# Patient Record
Sex: FEMALE | Race: OTHER | NOT HISPANIC OR LATINO | URBAN - METROPOLITAN AREA
[De-identification: names, ages, dates, MRNs, and addresses within clinical notes are randomized per-mention and may not be internally consistent; named-entity substitution may affect disease eponyms.]

---

## 2022-04-10 ENCOUNTER — EMERGENCY (EMERGENCY)
Facility: HOSPITAL | Age: 32
LOS: 1 days | Discharge: HOME | End: 2022-04-10
Attending: EMERGENCY MEDICINE | Admitting: EMERGENCY MEDICINE
Payer: COMMERCIAL

## 2022-04-10 VITALS
TEMPERATURE: 98 F | HEART RATE: 82 BPM | RESPIRATION RATE: 18 BRPM | OXYGEN SATURATION: 98 % | SYSTOLIC BLOOD PRESSURE: 117 MMHG | DIASTOLIC BLOOD PRESSURE: 71 MMHG

## 2022-04-10 VITALS
WEIGHT: 255.07 LBS | DIASTOLIC BLOOD PRESSURE: 82 MMHG | HEART RATE: 81 BPM | OXYGEN SATURATION: 99 % | RESPIRATION RATE: 18 BRPM | SYSTOLIC BLOOD PRESSURE: 121 MMHG | TEMPERATURE: 98 F

## 2022-04-10 DIAGNOSIS — R10.9 UNSPECIFIED ABDOMINAL PAIN: ICD-10-CM

## 2022-04-10 DIAGNOSIS — Z88.0 ALLERGY STATUS TO PENICILLIN: ICD-10-CM

## 2022-04-10 DIAGNOSIS — N83.11 CORPUS LUTEUM CYST OF RIGHT OVARY: ICD-10-CM

## 2022-04-10 DIAGNOSIS — D25.9 LEIOMYOMA OF UTERUS, UNSPECIFIED: ICD-10-CM

## 2022-04-10 LAB
ALBUMIN SERPL ELPH-MCNC: 4 G/DL — SIGNIFICANT CHANGE UP (ref 3.3–5)
ALP SERPL-CCNC: 97 U/L — SIGNIFICANT CHANGE UP (ref 40–120)
ALT FLD-CCNC: 8 U/L — LOW (ref 10–45)
ANION GAP SERPL CALC-SCNC: 12 MMOL/L — SIGNIFICANT CHANGE UP (ref 5–17)
APPEARANCE UR: CLEAR — SIGNIFICANT CHANGE UP
AST SERPL-CCNC: 26 U/L — SIGNIFICANT CHANGE UP (ref 10–40)
BACTERIA # UR AUTO: PRESENT /HPF
BASOPHILS # BLD AUTO: 0.05 K/UL — SIGNIFICANT CHANGE UP (ref 0–0.2)
BASOPHILS NFR BLD AUTO: 0.7 % — SIGNIFICANT CHANGE UP (ref 0–2)
BILIRUB SERPL-MCNC: 0.2 MG/DL — SIGNIFICANT CHANGE UP (ref 0.2–1.2)
BILIRUB UR-MCNC: NEGATIVE — SIGNIFICANT CHANGE UP
BUN SERPL-MCNC: 9 MG/DL — SIGNIFICANT CHANGE UP (ref 7–23)
CALCIUM SERPL-MCNC: 9 MG/DL — SIGNIFICANT CHANGE UP (ref 8.4–10.5)
CHLORIDE SERPL-SCNC: 105 MMOL/L — SIGNIFICANT CHANGE UP (ref 96–108)
CO2 SERPL-SCNC: 21 MMOL/L — LOW (ref 22–31)
COLOR SPEC: YELLOW — SIGNIFICANT CHANGE UP
COMMENT - URINE: SIGNIFICANT CHANGE UP
CREAT SERPL-MCNC: 0.57 MG/DL — SIGNIFICANT CHANGE UP (ref 0.5–1.3)
DIFF PNL FLD: NEGATIVE — SIGNIFICANT CHANGE UP
EGFR: 125 ML/MIN/1.73M2 — SIGNIFICANT CHANGE UP
EOSINOPHIL # BLD AUTO: 0.12 K/UL — SIGNIFICANT CHANGE UP (ref 0–0.5)
EOSINOPHIL NFR BLD AUTO: 1.7 % — SIGNIFICANT CHANGE UP (ref 0–6)
EPI CELLS # UR: SIGNIFICANT CHANGE UP /HPF (ref 0–5)
GLUCOSE SERPL-MCNC: 108 MG/DL — HIGH (ref 70–99)
GLUCOSE UR QL: NEGATIVE — SIGNIFICANT CHANGE UP
HCT VFR BLD CALC: 40.6 % — SIGNIFICANT CHANGE UP (ref 34.5–45)
HGB BLD-MCNC: 13.9 G/DL — SIGNIFICANT CHANGE UP (ref 11.5–15.5)
HYALINE CASTS # UR AUTO: SIGNIFICANT CHANGE UP /LPF (ref 0–2)
IMM GRANULOCYTES NFR BLD AUTO: 0.3 % — SIGNIFICANT CHANGE UP (ref 0–1.5)
KETONES UR-MCNC: NEGATIVE — SIGNIFICANT CHANGE UP
LEUKOCYTE ESTERASE UR-ACNC: ABNORMAL
LYMPHOCYTES # BLD AUTO: 2.07 K/UL — SIGNIFICANT CHANGE UP (ref 1–3.3)
LYMPHOCYTES # BLD AUTO: 29 % — SIGNIFICANT CHANGE UP (ref 13–44)
MCHC RBC-ENTMCNC: 31.4 PG — SIGNIFICANT CHANGE UP (ref 27–34)
MCHC RBC-ENTMCNC: 34.2 GM/DL — SIGNIFICANT CHANGE UP (ref 32–36)
MCV RBC AUTO: 91.9 FL — SIGNIFICANT CHANGE UP (ref 80–100)
MONOCYTES # BLD AUTO: 0.41 K/UL — SIGNIFICANT CHANGE UP (ref 0–0.9)
MONOCYTES NFR BLD AUTO: 5.7 % — SIGNIFICANT CHANGE UP (ref 2–14)
NEUTROPHILS # BLD AUTO: 4.48 K/UL — SIGNIFICANT CHANGE UP (ref 1.8–7.4)
NEUTROPHILS NFR BLD AUTO: 62.6 % — SIGNIFICANT CHANGE UP (ref 43–77)
NITRITE UR-MCNC: NEGATIVE — SIGNIFICANT CHANGE UP
NRBC # BLD: 0 /100 WBCS — SIGNIFICANT CHANGE UP (ref 0–0)
PH UR: 6 — SIGNIFICANT CHANGE UP (ref 5–8)
PLATELET # BLD AUTO: 207 K/UL — SIGNIFICANT CHANGE UP (ref 150–400)
POTASSIUM SERPL-MCNC: SIGNIFICANT CHANGE UP MMOL/L (ref 3.5–5.3)
POTASSIUM SERPL-SCNC: SIGNIFICANT CHANGE UP MMOL/L (ref 3.5–5.3)
PROT SERPL-MCNC: 7.5 G/DL — SIGNIFICANT CHANGE UP (ref 6–8.3)
PROT UR-MCNC: NEGATIVE MG/DL — SIGNIFICANT CHANGE UP
RBC # BLD: 4.42 M/UL — SIGNIFICANT CHANGE UP (ref 3.8–5.2)
RBC # FLD: 12.9 % — SIGNIFICANT CHANGE UP (ref 10.3–14.5)
RBC CASTS # UR COMP ASSIST: < 5 /HPF — SIGNIFICANT CHANGE UP
SODIUM SERPL-SCNC: 138 MMOL/L — SIGNIFICANT CHANGE UP (ref 135–145)
SP GR SPEC: 1.02 — SIGNIFICANT CHANGE UP (ref 1–1.03)
UROBILINOGEN FLD QL: 0.2 E.U./DL — SIGNIFICANT CHANGE UP
WBC # BLD: 7.15 K/UL — SIGNIFICANT CHANGE UP (ref 3.8–10.5)
WBC # FLD AUTO: 7.15 K/UL — SIGNIFICANT CHANGE UP (ref 3.8–10.5)
WBC UR QL: < 5 /HPF — SIGNIFICANT CHANGE UP

## 2022-04-10 PROCEDURE — 80053 COMPREHEN METABOLIC PANEL: CPT

## 2022-04-10 PROCEDURE — 76856 US EXAM PELVIC COMPLETE: CPT

## 2022-04-10 PROCEDURE — 76856 US EXAM PELVIC COMPLETE: CPT | Mod: 26

## 2022-04-10 PROCEDURE — 81001 URINALYSIS AUTO W/SCOPE: CPT

## 2022-04-10 PROCEDURE — 99284 EMERGENCY DEPT VISIT MOD MDM: CPT | Mod: 25

## 2022-04-10 PROCEDURE — 99285 EMERGENCY DEPT VISIT HI MDM: CPT

## 2022-04-10 PROCEDURE — 85025 COMPLETE CBC W/AUTO DIFF WBC: CPT

## 2022-04-10 PROCEDURE — 76830 TRANSVAGINAL US NON-OB: CPT

## 2022-04-10 PROCEDURE — 76830 TRANSVAGINAL US NON-OB: CPT | Mod: 26

## 2022-04-10 PROCEDURE — 36415 COLL VENOUS BLD VENIPUNCTURE: CPT

## 2022-04-10 RX ORDER — IBUPROFEN 200 MG
600 TABLET ORAL ONCE
Refills: 0 | Status: COMPLETED | OUTPATIENT
Start: 2022-04-10 | End: 2022-04-10

## 2022-04-10 RX ADMIN — Medication 600 MILLIGRAM(S): at 13:38

## 2022-04-10 NOTE — ED PROVIDER NOTE - OBJECTIVE STATEMENT
The pt is a 31 y/o F, who presents to ED c/o R sided abd "pressure" x 1 wk. Pt states "I don't have pain, it's pressure", constant and dull, 5/10, non radiating, took a tyl w/o change in symptoms, hx of ruptured ovarian cyst in past, LMP 3/20. Denies n/v/d, anorexia, flank pain, dysuria, vag dc, fevers, chills, dizziness. Sexually active but no concern for STDs, declining testing, has own gyn for f/u and just had routine visit (all neg)

## 2022-04-10 NOTE — ED PROVIDER NOTE - NS ED ATTENDING STATEMENT MOD
This was a shared visit with the LETI. I reviewed and verified the documentation and independently performed the documented:

## 2022-04-10 NOTE — ED PROVIDER NOTE - NSFOLLOWUPINSTRUCTIONS_ED_ALL_ED_FT
take ibuprofen as needed, follow up with your gyn doctor  Ovarian Cyst  An ovarian cyst is a fluid-filled sac on an ovary. Most of these cysts go away on their own and are not cancer. Some cysts need treatment  What are the causes?  •Ovarian hyperstimulation syndrome. Some medicines may lead to this problem.  •Polycystic ovarian syndrome (PCOS). Problems with body chemicals (hormones) can lead to this condition.  •The normal menstrual cycle.  What increases the risk  •Being overweight or very overweight.  •Taking medicines to increase your chance of getting pregnant.  •Using some types of birth control.  •Smoking.  What are the signs or symptoms?  Many ovarian cysts do not cause symptoms. If you get symptoms, you may have:  •Pain or pressure in the area between the hip bones.  •Pain in the lower belly.  •Pain during sex.  •Swelling in the lower belly.  •Periods that are not regular.  •Pain with periods.  How is this treated?  Many ovarian cysts go away on their own without treatment. If you need treatment, it may include:  •Medicines for pain.  •Fluid taken out of the cyst.  •The cyst being taken out.  •Birth control pills or other medicines.  •Surgery to remove the ovary.  Follow these instructions at home:  •Take over-the-counter and prescription medicines only as told by your doctor.  •Ask your doctor if you should avoid driving or using machines while you are taking your medicine.  •Get exams and Pap tests as told by your doctor.  •Return to your normal activities when your doctor says that it is safe.  • Do not smoke or use any products that contain nicotine or tobacco. If you need help quitting, ask your doctor.  •Keep all follow-up visits.  Contact a doctor if:  •Your periods:  •Are late.  •Are not regular.  •Stop.  •Are painful.  •You have pain in the area between your hip bones, and the pain does not go away.  •You feel pressure on your bladder.  •You have trouble peeing.  •You feel full, or your belly hurts, swells, or bloats.  •You gain or lose weight without trying, or you are less hungry than normal.  •You feel pain and pressure in your back.  •You feel pain and pressure in the area between your hip bones.  •You think you may be pregnant.  Get help right away if:  •You have pain in your belly that is very bad or gets worse.  •You have pain in the area between your hip bones, and the pain is very bad or gets worse.  •You cannot eat or drink without vomiting.  •You get a fever or chills all of a sudden.  •Your period is a lot heavier than usual.  Summary  •An ovarian cyst is a fluid-filled sac on an ovary.  •Some cysts may cause problems and need treatment.      •Most of these cysts go away on their own.

## 2022-04-10 NOTE — ED PROVIDER NOTE - ATTENDING CONTRIBUTION TO CARE
Vitals wnl, exam as above. Very well appearing.   UA trace leuk esterase, no infection. Labs grossly wnl. ucg neg. US prelim showing "Right corpus luteal cyst, likely ruptured with mild fluid about the right   adnexa and within the cul-de-sac." Given motrin.  Pt pain free, still mild pressure. Benign abdomen. Clinically not appy.      Discussed importance of outpt follow up and return precautions. Clinically no indication for further emergent ED workup or hospitalization at this time. Comfortable for dc, outpt f/u.

## 2022-04-10 NOTE — ED PROVIDER NOTE - PATIENT PORTAL LINK FT
You can access the FollowMyHealth Patient Portal offered by [facility] by registering at the following website: [email or web address]. By joining [employer]’s FollowMyHealth portal, you will also be able to view your health information using other applications (apps) compatible with our system.

## 2022-04-10 NOTE — ED ADULT NURSE NOTE - NSIMPLEMENTINTERV_GEN_ALL_ED
Implemented All Universal Safety Interventions:  [place] to call system. Call bell, personal items and telephone within reach. Instruct patient to call for assistance. Room bathroom lighting operational. Non-slip footwear when patient is off stretcher. Physically safe environment: no spills, clutter or unnecessary equipment. Stretcher in lowest position, wheels locked, appropriate side rails in place.

## 2022-04-10 NOTE — ED ADULT NURSE NOTE - OBJECTIVE STATEMENT
pt a&ox4 ambulatory comes to ED c/o non-radiating, intermittent abdominal pressure RLQ x 1 week, pt. reports day after having sex. Pt. reports pressure began in her pelvic region after sex, and moved over to her RLQ. Pt. denies pain / tenderness upon palpation, abdominal bloating, n/v/d, fever, chills, heartburn. Pt. denies burning while urinating, recalls stronger odor to her urine over last few days. Pt. reports she urinated after intercourse, LMP 03/20.

## 2022-04-10 NOTE — ED PROVIDER NOTE - CLINICAL SUMMARY MEDICAL DECISION MAKING FREE TEXT BOX
pt c/o r lower abd "pressure" x 1wk, hx of ruptured ovarian cyst in past, lmp 3/20, no associated n/v/d/anorexia/fevers - no concern for appy, no dysuria - ua neg, no vag dc - pt offered std testing but declined - just saw her gyn for routine exam and neg, labs wnl, us done and + ruptured cyst, pt symptomatically improved w/ibuprofen - to con't same, f/u w/gyn, pt understands and agrees w/plan, strict return precautions given

## 2023-05-02 ENCOUNTER — EMERGENCY (EMERGENCY)
Facility: HOSPITAL | Age: 33
LOS: 1 days | Discharge: HOME | End: 2023-05-02
Attending: STUDENT IN AN ORGANIZED HEALTH CARE EDUCATION/TRAINING PROGRAM | Admitting: STUDENT IN AN ORGANIZED HEALTH CARE EDUCATION/TRAINING PROGRAM
Payer: COMMERCIAL

## 2023-05-02 VITALS
SYSTOLIC BLOOD PRESSURE: 107 MMHG | OXYGEN SATURATION: 98 % | WEIGHT: 220.02 LBS | DIASTOLIC BLOOD PRESSURE: 79 MMHG | TEMPERATURE: 99 F | HEART RATE: 117 BPM | RESPIRATION RATE: 18 BRPM | HEIGHT: 65 IN

## 2023-05-02 VITALS
RESPIRATION RATE: 18 BRPM | SYSTOLIC BLOOD PRESSURE: 120 MMHG | OXYGEN SATURATION: 99 % | TEMPERATURE: 98 F | DIASTOLIC BLOOD PRESSURE: 86 MMHG | HEART RATE: 88 BPM

## 2023-05-02 PROCEDURE — 99284 EMERGENCY DEPT VISIT MOD MDM: CPT

## 2023-05-02 RX ORDER — SODIUM CHLORIDE 9 MG/ML
1000 INJECTION, SOLUTION INTRAVENOUS ONCE
Refills: 0 | Status: COMPLETED | OUTPATIENT
Start: 2023-05-02 | End: 2023-05-02

## 2023-05-02 NOTE — ED ADULT NURSE NOTE - OBJECTIVE STATEMENT
pt states she developed diarrhea yesterday (no n/v) and went to  who told her she has a stomach bug and should drink gatrorade.   pt went home and had been drinking red gatorade and then today her stool is red and she is concerned that she is internally bleeding.

## 2023-05-02 NOTE — ED PROVIDER NOTE - CLINICAL SUMMARY MEDICAL DECISION MAKING FREE TEXT BOX
32 yo female with no medical hx p/w blood diarrhea that started today- associated with abdominal cramping and nausea that started 3 days ago after eating a chicken head during a [employer] ceremony. Now asymptomatic. No blood on rectal exam. No abdominal ttp. Showed a picture of her bloody stools in the toilet bowel from earlier today. Will obtain labs to check lytes and Hb. GI PCR. Likely gastroenteritis- viral vs bacterial

## 2023-05-02 NOTE — ED PROVIDER NOTE - PHYSICAL EXAMINATION
VITAL SIGNS: I have reviewed nursing notes and confirm.  CONSTITUTIONAL: Well appearing, in no acute distress.   SKIN:  warm and dry, no acute rash.   HEAD:  normocephalic, atraumatic.  EYES: EOM intact; conjunctiva and sclera clear.  ENT: No nasal discharge; airway clear.   NECK: Supple; non tender.  CARD: S1, S2 normal; no murmurs, gallops, or rubs. Regular rate and rhythm.   RESP:  Clear to auscultation b/l, no wheezes, rales or rhonchi.  ABD: Normal bowel sounds; soft; non-distended; non-tender; no guarding/ rebound.  RECTAL: soft brown stool, no BRBPR or melena  EXT: Normal ROM. No clubbing, cyanosis or edema. 2+ pulses to b/l ue/le.  NEURO: Alert, oriented, grossly unremarkable  PSYCH: Cooperative, mood and affect appropriate.

## 2023-05-02 NOTE — ED ADULT NURSE NOTE - CAS TRG GEN SKIN CONDITION
Warm/Dry Consent (Spinal Accessory)/Introductory Paragraph: The rationale for Mohs was explained to the patient and consent was obtained. The risks, benefits and alternatives to therapy were discussed in detail. Specifically, the risks of damage to the spinal accessory nerve, infection, scarring, bleeding, prolonged wound healing, incomplete removal, allergy to anesthesia, and recurrence were addressed. Prior to the procedure, the treatment site was clearly identified and confirmed by the patient. All components of Universal Protocol/PAUSE Rule completed.

## 2023-05-02 NOTE — ED ADULT NURSE NOTE - CHIEF COMPLAINT QUOTE
diarrhea and fever yesterday, abdominal pain went to urgent care today, advised to drink Gatorade, about 2 hours ago had diarrhea with red colored stool pt concerned might be blood or due to red colored Gatorade she drink about 2pm today.

## 2023-05-02 NOTE — ED ADULT TRIAGE NOTE - CHIEF COMPLAINT QUOTE
diarrhea and fever yesterday, abdominal pain went to urgent care, advised to drink Gatorade, about 2 hours ago had diarrhea with red colored stool pt concerned might be blood or due to red colored Gatorade she drink about 2pm today. diarrhea and fever yesterday, abdominal pain went to urgent care today, advised to drink Gatorade, about 2 hours ago had diarrhea with red colored stool pt concerned might be blood or due to red colored Gatorade she drink about 2pm today.

## 2023-05-02 NOTE — ED PROVIDER NOTE - OBJECTIVE STATEMENT
32 yo female with no medical hx p/w blood diarrhea that started today- associated with abdominal cramping and nausea that started 3 days ago after eating a chicken head during a [employer] ceremony. Reports she ate a cooked chicken head 3 days ago. Nobody else ate from the same dish. Reports feeling abdominal cramping and nausea immediately after. Associated diarrhea that became bloody today. Abdominal cramping and nausea resolved. Denies urinary symptoms, rectal pain, vaginal bleeding, hematuria, dysuria, vomiting. Reports fever 100.6 2 days ago. No dizziness, syncope, cp, sob. No recent travel or antibx use. No hx of inflammatory bowel disease.

## 2023-05-02 NOTE — ED ADULT NURSE NOTE - NSIMPLEMENTINTERV_GEN_ALL_ED
Implemented All Universal Safety Interventions:  [place] to call system. Call bell, personal items and telephone within reach. Instruct patient to call for assistance. Room bathroom lighting operational. Non-slip footwear when patient is off stretcher. Physically safe environment: no spills, clutter or unnecessary equipment. Stretcher in lowest position, wheels locked, appropriate side rails in place.

## 2023-05-02 NOTE — ED ADULT NURSE NOTE - NS ED NURSE DC INFO COMPLEXITY
Moderate: Comprehensive teaching/Straightforward: Basic instructions, no meds, no home treatment/Returned Demonstration

## 2023-05-02 NOTE — ED PROVIDER NOTE - NSFOLLOWUPINSTRUCTIONS_ED_ALL_ED_FT
English    Bloody Diarrhea  Bloody diarrhea is frequent loose and watery bowel movements that contain blood. The blood can be hard to see or notice (occult). Bloody diarrhea may be caused by medical conditions such as:  Ulcerative colitis.  Crohn's disease.  Intestinal infection.  Viral gastroenteritis or bacterial gastroenteritis.  Finding out why there is blood in your diarrhea is necessary so that your health care provider can prescribe the right treatment for you. Follow the instructions from your health care provider about treating the cause of your bloody diarrhea.    Any type of diarrhea can make you feel weak and dehydrated. Dehydration can make you tired and thirsty, cause you to have a dry mouth, and decrease how often you urinate.    Follow these instructions at home:  Eating and drinking    A bottle of clear fruit juice and glass of water.   Bread, rice, and cereal from the grain group.   Follow these recommendations as told by your health care provider:  Take an oral rehydration solution (ORS). This is an over-the-counter medicine that helps return your body to its normal balance of nutrients and water. It is found at pharmacies and retail stores.  Drink enough fluid to keep your urine pale yellow.  Drink fluids such as water, ice chips, diluted fruit juice, and low-calorie sports drinks. You can also drink milk products, if desired.  Avoid drinking fluids that contain a lot of sugar or caffeine, such as energy drinks, regular sports drinks, and soda.  Avoid alcohol.  Eat bland, easy-to-digest foods in small amounts as you are able. These foods include bananas, applesauce, rice, lean meats, toast, and crackers.  Avoid spicy or fatty foods.  Medicines    Take over-the-counter and prescription medicines only as told by your health care provider.  Your health care provider may prescribe medicine to slow down the frequency of diarrhea or to ease stomach discomfort.  If you were prescribed an antibiotic medicine, take it as told by your health care provider. Do not stop using the antibiotic even if you start to feel better.  General instructions    Washing hands with soap and water.  Wash your hands often using soap and water. If soap and water are not available, use a hand . Others in the household should wash their hands as well. Hands should be washed:  After using the toilet or changing a diaper.  Before preparing, cooking, or serving food.  While caring for a sick person or while visiting someone in a hospital.  Rest at home while you recover.  Take a warm bath to relieve any burning or pain from frequent diarrhea episodes.  Watch your condition for any changes.  Keep all follow-up visits as told by your health care provider. This is important.  Contact a health care provider if:  You have a fever.  Your diarrhea gets worse.  You have new symptoms.  You cannot keep fluids down.  You feel light-headed or dizzy.  You have a headache.  You have muscle cramps.  Get help right away if:  You have chest pain.  You feel extremely weak or you faint.  The blood in your diarrhea increases or turns a different color.  You vomit and the vomit is bloody or looks black.  You have persistent diarrhea.  You have severe pain, cramping, or bloating in your abdomen.  You have trouble breathing or you are breathing very quickly.  Your heart is beating very quickly.  Your skin feels cold and clammy.  You feel confused.  You have signs of dehydration, such as:  Dark urine, very little urine, or no urine.  Cracked lips.  Dry mouth.  Sunken eyes.  Sleepiness.  Weakness.  Summary  Bloody diarrhea is frequent loose and watery bowel movements that contain blood. The blood can be hard to see or notice (occult).  Follow the instructions from your health care provider about treating the cause of your bloody diarrhea.  Any type of diarrhea can make you feel weak and dehydrated.  Follow your health care provider's recommendations for eating and drinking and for taking medicines.  Contact your health care provider if your symptoms get worse. Get help right away if you have signs of dehydration.  This information is not intended to replace advice given to you by your health care provider. Make sure you discuss any questions you have with your health care provider.    Document Revised: 06/29/2022 Document Reviewed: 06/29/2022  [facility] Patient Education © 2023 [employer]

## 2023-05-02 NOTE — ED PROVIDER NOTE - PROGRESS NOTE DETAILS
HypoK on labs. Given repletion. Diarrhea resolved; unable to give us sample for GI PCR. Discharged home with follow up and return precautions.

## 2023-05-03 PROCEDURE — 36415 COLL VENOUS BLD VENIPUNCTURE: CPT

## 2023-05-03 PROCEDURE — 99283 EMERGENCY DEPT VISIT LOW MDM: CPT | Mod: 25

## 2023-05-03 PROCEDURE — 80053 COMPREHEN METABOLIC PANEL: CPT

## 2023-05-03 PROCEDURE — 85025 COMPLETE CBC W/AUTO DIFF WBC: CPT

## 2023-05-03 PROCEDURE — 84702 CHORIONIC GONADOTROPIN TEST: CPT

## 2023-05-03 PROCEDURE — 96360 HYDRATION IV INFUSION INIT: CPT

## 2023-05-03 RX ORDER — POTASSIUM CHLORIDE 20 MEQ
40 PACKET (EA) ORAL ONCE
Refills: 0 | Status: COMPLETED | OUTPATIENT
Start: 2023-05-03 | End: 2023-05-03

## 2023-05-03 RX ADMIN — Medication 40 MILLIEQUIVALENT(S): at 00:39

## 2023-05-03 RX ADMIN — SODIUM CHLORIDE 1000 MILLILITER(S): 9 INJECTION, SOLUTION INTRAVENOUS at 00:19

## 2023-05-03 RX ADMIN — SODIUM CHLORIDE 1000 MILLILITER(S): 9 INJECTION, SOLUTION INTRAVENOUS at 01:14

## 2023-05-03 RX ADMIN — Medication 10 MILLIGRAM(S): at 00:18

## 2023-05-05 ENCOUNTER — EMERGENCY (EMERGENCY)
Facility: HOSPITAL | Age: 33
LOS: 1 days | Discharge: HOME | End: 2023-05-05
Attending: STUDENT IN AN ORGANIZED HEALTH CARE EDUCATION/TRAINING PROGRAM | Admitting: STUDENT IN AN ORGANIZED HEALTH CARE EDUCATION/TRAINING PROGRAM
Payer: COMMERCIAL

## 2023-05-05 VITALS
SYSTOLIC BLOOD PRESSURE: 120 MMHG | WEIGHT: 218.04 LBS | HEIGHT: 65 IN | TEMPERATURE: 98 F | DIASTOLIC BLOOD PRESSURE: 84 MMHG | OXYGEN SATURATION: 99 % | HEART RATE: 86 BPM | RESPIRATION RATE: 17 BRPM

## 2023-05-05 DIAGNOSIS — K92.1 MELENA: ICD-10-CM

## 2023-05-05 DIAGNOSIS — R19.7 DIARRHEA, UNSPECIFIED: ICD-10-CM

## 2023-05-05 DIAGNOSIS — Z88.0 ALLERGY STATUS TO PENICILLIN: ICD-10-CM

## 2023-05-05 DIAGNOSIS — R10.9 UNSPECIFIED ABDOMINAL PAIN: ICD-10-CM

## 2023-05-05 DIAGNOSIS — R11.2 NAUSEA WITH VOMITING, UNSPECIFIED: ICD-10-CM

## 2023-05-05 DIAGNOSIS — R11.0 NAUSEA: ICD-10-CM

## 2023-05-05 PROCEDURE — 99284 EMERGENCY DEPT VISIT MOD MDM: CPT

## 2023-05-05 RX ORDER — SODIUM CHLORIDE 9 MG/ML
1000 INJECTION INTRAMUSCULAR; INTRAVENOUS; SUBCUTANEOUS ONCE
Refills: 0 | Status: COMPLETED | OUTPATIENT
Start: 2023-05-05 | End: 2023-05-05

## 2023-05-05 RX ADMIN — SODIUM CHLORIDE 1000 MILLILITER(S): 9 INJECTION INTRAMUSCULAR; INTRAVENOUS; SUBCUTANEOUS at 23:53

## 2023-05-05 NOTE — ED ADULT NURSE NOTE - OBJECTIVE STATEMENT
pt c/o rectal bleeding since 2 days ago, has been seen in the ED for same complaint. pt states that she ate bad chicken 2 days ago, and has been feeling sick since. pt states that she has salmonella and needs antibiotics. lungs clear bilaterally upon aucultation. no use of accessory muscles noted. Denies any chest pain or discomfort at this time. denies nausea vomiting or diarrhea at this time.

## 2023-05-05 NOTE — ED PROVIDER NOTE - PROGRESS NOTE DETAILS
pt unable provide stool sample, labs- no leukocytosis, no changes in H/H. pt is pain free. pt seeking discharge home.

## 2023-05-05 NOTE — ED PROVIDER NOTE - CLINICAL SUMMARY MEDICAL DECISION MAKING FREE TEXT BOX
32 y/o F with no PMHx presents to the ED c/o bloody diarrhea. Pt was seen in ED on May 2nd for same symptoms and was discharged home. Abdominal exam is benign, will perform repeat blood test and obtain GI PCR to check stool for possible bacteria, viruses and cultures. Anticipate discharge home. Based on clinical presentation no imaging needed for at this time. 32 y/o F with no PMHx presents to the ED c/o bloody diarrhea. Pt was seen in ED on May 2nd for same symptoms and was discharged home. Abdominal exam is benign, will repeat blood test and obtain GI PCR to check stool for possible bacteria, viruses.  Anticipate discharge home. Based on clinical presentation unlikely acute abdominal process and  no imaging needed at this time.

## 2023-05-05 NOTE — ED PROVIDER NOTE - ATTENDING APP SHARED VISIT CONTRIBUTION OF CARE
32 y/o F with no PMHx presents to the ED c/o bloody diarrhea. Pt was seen in ED on May 2nd for same symptoms and was discharged home. Abdominal exam is benign, will repeat blood test and obtain GI PCR to check stool for possible bacteria, viruses.  Anticipate discharge home. Based on clinical presentation unlikely acute abdominal process and  no imaging needed at this time.

## 2023-05-05 NOTE — ED PROVIDER NOTE - NSFOLLOWUPINSTRUCTIONS_ED_ALL_ED_FT
Please follow up with gastroenterologist ion a few days for re-evaluation if your symptoms persists.       Rectal Bleeding    Rectal bleeding is when blood passes out of the opening between the buttocks (anus). People with rectal bleeding may notice bright red blood in their underwear or in the toilet after having a bowel movement. They may also have blood mixed with their stool (feces), or dark red or black stools.    Rectal bleeding is usually a sign that something is wrong. Many things can cause rectal bleeding, including:  Diverticulosis. This is a condition in which pockets or sacs project from the bowel.  Hemorrhoids. These are blood vessels around the anus or inside the rectum that are larger than normal.  Anal fissures. This is a tear in the anus.  Proctitis and colitis. These are conditions in which the rectum, colon, or anus become inflamed.  Polyps. These are growths that can be cancerous (malignant) or noncancerous (benign).  Infections of the intestines.  Fistulas. These are abnormal openings in the rectum and anus.  Rectal prolapse. This is when a part of the rectum sticks out from the anus.  Follow these instructions at home:  Pay attention to any changes in your symptoms. Take these actions to help reduce bleeding and discomfort:    Medicines    Take over-the-counter and prescription medicines only as told by your health care provider.  Ask your health care provider about changing or stopping your regular medicines or supplements. This is especially important if you are taking blood thinners. Medicines that thin the blood can make rectal bleeding worse.  Managing constipation      Your condition may cause constipation. To prevent or treat constipation, or to help make your stools soft, you may need to:  Drink enough fluid to keep your urine pale yellow.  Take over-the-counter or prescription medicines.  Eat foods that are high in fiber, such as beans, whole grains, and fresh fruits and vegetables. Ask your health care provider if you need a supplement to give you more fiber.  Limit foods that are high in fat and processed sugars, such as fried or sweet foods.  General instructions    Try not to strain when having a bowel movement.  Try taking a warm bath. This may help to soothe any pain in your rectum.  Keep all follow-up visits as told by your health care provider. This is important.  Contact a health care provider if you:  Have pain or tenderness in your abdomen.  Have a fever.  Have weakness.  Have nausea.  Cannot have a bowel movement.  Get help right away if you have:  New or increased rectal bleeding.  Black or dark red stools.  Vomit with blood or something that looks like coffee grounds.  A fainting episode.  Severe pain in your rectum.  Summary  Rectal bleeding is usually a sign that something is wrong. This condition should be evaluated by a health care provider.  Eat a diet that is high in fiber. This will help keep your stools soft, making it easier to pass stools without straining.  Medicines that thin the blood can make rectal bleeding worse.  Get help right away if you have new or increased rectal bleeding, black or dark red stools, blood in your vomit, an episode of fainting, or severe pain in your rectum.  This information is not intended to replace advice given to you by your health care provider. Make sure you discuss any questions you have with your health care provider.

## 2023-05-05 NOTE — ED ADULT TRIAGE NOTE - CHIEF COMPLAINT QUOTE
Pt presents with c/o ongoing "rectal bleeding". Pt states "after I have a bowel movement, I see blood on the toilet paper (BRB) and some in my stool". No bleeding afterwards per pt. Denies any abdominal pain. Seen in ER x 2 days ago for c/o same.

## 2023-05-05 NOTE — ED PROVIDER NOTE - PATIENT PORTAL LINK FT
You can access the FollowMyHealth Patient Portal offered by [facility] by registering at the following website: [email or web address]. By joining [employer]’s FollowMyHealth portal, you will also be able to view your health information using other applications (apps) compatible with our system.

## 2023-05-05 NOTE — ED PROVIDER NOTE - OBJECTIVE STATEMENT
34 y/o F healthy with no PMHx presents to the ED c/o bloody diarrhea. Pt was seen for same symptoms on May 2nd, reports that when her diarrhea had started. Pt mentioned she had some nausea and vomiting the day before the diarrhea started. Pt also reports some cramping abdominal pain and 1 days of fever. After evaluation in ER on May 2nd blood work was normal, pt was hydrated and feeling better and was discharged home. Pt states she was felling well yesterday but today had another episode of diarrhea and a little bloody mucous clot with it. Pt states her pain is completely gone and reports no nausea, vomiting, fever today. Pt states she was improving and feeling better until her episode of diarrhea tonight.

## 2023-05-06 VITALS
HEART RATE: 74 BPM | SYSTOLIC BLOOD PRESSURE: 122 MMHG | DIASTOLIC BLOOD PRESSURE: 79 MMHG | RESPIRATION RATE: 16 BRPM | OXYGEN SATURATION: 98 %

## 2023-05-06 LAB
ALBUMIN SERPL ELPH-MCNC: 3.7 G/DL — SIGNIFICANT CHANGE UP (ref 3.3–5)
ALP SERPL-CCNC: 95 U/L — SIGNIFICANT CHANGE UP (ref 40–120)
ALT FLD-CCNC: 13 U/L — SIGNIFICANT CHANGE UP (ref 10–45)
ANION GAP SERPL CALC-SCNC: 11 MMOL/L — SIGNIFICANT CHANGE UP (ref 5–17)
AST SERPL-CCNC: 24 U/L — SIGNIFICANT CHANGE UP (ref 10–40)
BASOPHILS # BLD AUTO: 0.07 K/UL — SIGNIFICANT CHANGE UP (ref 0–0.2)
BASOPHILS NFR BLD AUTO: 0.9 % — SIGNIFICANT CHANGE UP (ref 0–2)
BILIRUB SERPL-MCNC: <0.2 MG/DL — SIGNIFICANT CHANGE UP (ref 0.2–1.2)
BUN SERPL-MCNC: 9 MG/DL — SIGNIFICANT CHANGE UP (ref 7–23)
CALCIUM SERPL-MCNC: 9.3 MG/DL — SIGNIFICANT CHANGE UP (ref 8.4–10.5)
CHLORIDE SERPL-SCNC: 104 MMOL/L — SIGNIFICANT CHANGE UP (ref 96–108)
CO2 SERPL-SCNC: 22 MMOL/L — SIGNIFICANT CHANGE UP (ref 22–31)
CREAT SERPL-MCNC: 0.78 MG/DL — SIGNIFICANT CHANGE UP (ref 0.5–1.3)
EGFR: 104 ML/MIN/1.73M2 — SIGNIFICANT CHANGE UP
EOSINOPHIL # BLD AUTO: 0 K/UL — SIGNIFICANT CHANGE UP (ref 0–0.5)
EOSINOPHIL NFR BLD AUTO: 0 % — SIGNIFICANT CHANGE UP (ref 0–6)
GIANT PLATELETS BLD QL SMEAR: PRESENT — SIGNIFICANT CHANGE UP
GLUCOSE SERPL-MCNC: 91 MG/DL — SIGNIFICANT CHANGE UP (ref 70–99)
HCT VFR BLD CALC: 40.7 % — SIGNIFICANT CHANGE UP (ref 34.5–45)
HGB BLD-MCNC: 13.6 G/DL — SIGNIFICANT CHANGE UP (ref 11.5–15.5)
LYMPHOCYTES # BLD AUTO: 3.22 K/UL — SIGNIFICANT CHANGE UP (ref 1–3.3)
LYMPHOCYTES # BLD AUTO: 39.8 % — SIGNIFICANT CHANGE UP (ref 13–44)
MANUAL SMEAR VERIFICATION: SIGNIFICANT CHANGE UP
MCHC RBC-ENTMCNC: 30.8 PG — SIGNIFICANT CHANGE UP (ref 27–34)
MCHC RBC-ENTMCNC: 33.4 GM/DL — SIGNIFICANT CHANGE UP (ref 32–36)
MCV RBC AUTO: 92.1 FL — SIGNIFICANT CHANGE UP (ref 80–100)
MONOCYTES # BLD AUTO: 0.5 K/UL — SIGNIFICANT CHANGE UP (ref 0–0.9)
MONOCYTES NFR BLD AUTO: 6.2 % — SIGNIFICANT CHANGE UP (ref 2–14)
NEUTROPHILS # BLD AUTO: 4.16 K/UL — SIGNIFICANT CHANGE UP (ref 1.8–7.4)
NEUTROPHILS NFR BLD AUTO: 51.3 % — SIGNIFICANT CHANGE UP (ref 43–77)
NRBC # BLD: 1 /100 — HIGH (ref 0–0)
NRBC # BLD: SIGNIFICANT CHANGE UP /100 WBCS (ref 0–0)
PLAT MORPH BLD: NORMAL — SIGNIFICANT CHANGE UP
PLATELET # BLD AUTO: 406 K/UL — HIGH (ref 150–400)
POTASSIUM SERPL-MCNC: SIGNIFICANT CHANGE UP MMOL/L (ref 3.5–5.3)
POTASSIUM SERPL-SCNC: SIGNIFICANT CHANGE UP MMOL/L (ref 3.5–5.3)
PROT SERPL-MCNC: 7.4 G/DL — SIGNIFICANT CHANGE UP (ref 6–8.3)
RBC # BLD: 4.42 M/UL — SIGNIFICANT CHANGE UP (ref 3.8–5.2)
RBC # FLD: 12.7 % — SIGNIFICANT CHANGE UP (ref 10.3–14.5)
RBC BLD AUTO: NORMAL — SIGNIFICANT CHANGE UP
SODIUM SERPL-SCNC: 137 MMOL/L — SIGNIFICANT CHANGE UP (ref 135–145)
VARIANT LYMPHS # BLD: 1.8 % — SIGNIFICANT CHANGE UP (ref 0–6)
WBC # BLD: 8.1 K/UL — SIGNIFICANT CHANGE UP (ref 3.8–10.5)
WBC # FLD AUTO: 8.1 K/UL — SIGNIFICANT CHANGE UP (ref 3.8–10.5)

## 2023-05-06 PROCEDURE — 36415 COLL VENOUS BLD VENIPUNCTURE: CPT

## 2023-05-06 PROCEDURE — 80053 COMPREHEN METABOLIC PANEL: CPT

## 2023-05-06 PROCEDURE — 99283 EMERGENCY DEPT VISIT LOW MDM: CPT

## 2023-05-06 PROCEDURE — 85025 COMPLETE CBC W/AUTO DIFF WBC: CPT

## 2023-05-06 RX ORDER — POTASSIUM CHLORIDE 20 MEQ
40 PACKET (EA) ORAL ONCE
Refills: 0 | Status: COMPLETED | OUTPATIENT
Start: 2023-05-06 | End: 2023-05-06

## 2023-05-06 RX ADMIN — Medication 40 MILLIEQUIVALENT(S): at 01:28
